# Patient Record
Sex: FEMALE | Race: WHITE | NOT HISPANIC OR LATINO | URBAN - METROPOLITAN AREA
[De-identification: names, ages, dates, MRNs, and addresses within clinical notes are randomized per-mention and may not be internally consistent; named-entity substitution may affect disease eponyms.]

---

## 2022-02-27 ENCOUNTER — EMERGENCY (EMERGENCY)
Facility: HOSPITAL | Age: 67
LOS: 1 days | Discharge: HOME | End: 2022-02-27
Attending: EMERGENCY MEDICINE
Payer: MEDICARE

## 2022-02-27 VITALS
RESPIRATION RATE: 20 BRPM | DIASTOLIC BLOOD PRESSURE: 103 MMHG | TEMPERATURE: 98 F | OXYGEN SATURATION: 98 % | HEART RATE: 100 BPM | SYSTOLIC BLOOD PRESSURE: 180 MMHG | WEIGHT: 119.93 LBS | HEIGHT: 65 IN

## 2022-02-27 VITALS
HEART RATE: 92 BPM | DIASTOLIC BLOOD PRESSURE: 90 MMHG | RESPIRATION RATE: 18 BRPM | SYSTOLIC BLOOD PRESSURE: 161 MMHG | OXYGEN SATURATION: 97 %

## 2022-02-27 PROCEDURE — 70450 CT HEAD/BRAIN W/O DYE: CPT | Mod: MA

## 2022-02-27 PROCEDURE — 70450 CT HEAD/BRAIN W/O DYE: CPT | Mod: 26,MA

## 2022-02-27 PROCEDURE — 99284 EMERGENCY DEPT VISIT MOD MDM: CPT | Mod: 25

## 2022-02-27 PROCEDURE — 12011 RPR F/E/E/N/L/M 2.5 CM/<: CPT

## 2022-02-27 PROCEDURE — 12011 RPR F/E/E/N/L/M 2.5 CM/<: CPT | Mod: GC

## 2022-02-27 PROCEDURE — 99284 EMERGENCY DEPT VISIT MOD MDM: CPT | Mod: 25,GC

## 2022-02-27 NOTE — ED ADULT NURSE NOTE - NSIMPLEMENTINTERV_GEN_ALL_ED
Implemented All Fall Risk Interventions:  [place] to call system. Call bell, personal items and telephone within reach. Instruct patient to call for assistance. Room bathroom lighting operational. Non-slip footwear when patient is off stretcher. Physically safe environment: no spills, clutter or unnecessary equipment. Stretcher in lowest position, wheels locked, appropriate side rails in place. Provide visual cue, wrist band, yellow gown, etc. Monitor gait and stability. Monitor for mental status changes and reorient to person, place, and time. Review medications for side effects contributing to fall risk. Reinforce activity limits and safety measures with patient and family.

## 2022-02-27 NOTE — ED ADULT NURSE NOTE - OBJECTIVE STATEMENT
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

## 2022-02-27 NOTE — ED PROVIDER NOTE - OBJECTIVE STATEMENT
67 yo F with no pmhx presents with laceration to her head s/p fall. States she tripped and fell about 45 min ago. Unsure of what she hit her head on. No LOC. Ambulatory. Denies pain anywhere. No preceding symptoms. Denies n/v, dizziness, change in vision (wears contacts, still wearing them), chest pain, shortness of breath, abd pain, change in strength or sensation in extremities. Tetanus 3-4yrs ago.

## 2022-02-27 NOTE — ED PROVIDER NOTE - NSFOLLOWUPINSTRUCTIONS_ED_ALL_ED_FT
You were seen for laceration that is repaired with vicryl absorbable sutures - 3 sutures in vertical part of the laceration, one running suture in horizontal part of the laceration. These sutures are absorbable but can take about 2months to be completely absorbed. Please have them removed on day 7.     This will likely cause a scar - avoid sun exposure to limit the scarring.    No signs of emergency medical condition on today's workup.  Your results are attached with your discharge instructions, please review them with your primary care physician. If there is a result pending, you will receive a call if test is positive.    A presumptive diagnosis is made today, but further evaluation may be required by your primary care doctor and/or specialist for a definitive diagnosis. Therefore, follow up as directed and if symptoms change/worsen or any emergency conditions, please return to the ER.    For pain or fever you can ibuprofen (motrin, advil) or tylenol as needed, as directed on packaging.    If needed, call patient access services at [phone] to find a primary care doctor, or call at [phone] to make an appointment at the clinic.    Care For Your Stitches    WHAT YOU NEED TO KNOW:    Stitches, or sutures, are used to close cuts and wounds on the skin. Stitches need to be removed after your wound has healed.             DISCHARGE INSTRUCTIONS:    Seek care immediately if:   •Your stitches come apart.      •Blood soaks through your bandages.      •You suddenly cannot move your injured joint.      •You have sudden numbness around your wound.      •You see red streaks coming from your wound.      Contact your healthcare provider if:   •You have a fever and chills.      •Your wound is red, warm, swollen, or leaking pus.      •There is a bad smell coming from your wound.      •You have increased pain in the wound area.      •You have questions or concerns about your condition or care.      Care for your stitches:   •Protect the stitches. You may need to cover your stitches with a bandage for 24 to 48 hours, or as directed. Do not bump or hit the suture area. This could open the wound. Do not trim or shorten the ends of your stitches. If they rub on your clothing, put a gauze bandage between the stitches and your clothes.      •Clean the area as directed. Carefully wash your wound with soap and water. For mouth and lip wounds, rinse your mouth after meals and at bedtime. Ask your healthcare provider what to use to rinse your mouth. If you have a scalp wound, you may gently wash your hair every 2 days with mild shampoo. Do not use hair products, such as hair spray. Check your wound for signs of infection when you clean it. Signs include redness, swelling, and pus.      •Keep the area dry as directed. Wait 12 to 24 hours after you receive your stitches before you take a shower. Take showers instead of baths. Do not take a bath or swim. Your healthcare provider will give you instructions for bathing with your stitches.    Help your wound heal:   •Elevate your wound. Keep your wound above the level of your heart as often as you can. This will help decrease swelling and pain. Prop the area on pillows or blankets, if possible, to keep it elevated comfortably.    •Limit activity. Do not stretch the skin around your wound. This will help prevent bleeding and swelling.    Follow up with your healthcare provider as directed: You may need to return to have your stitches removed. Write down your questions so you remember to ask them during your visits.

## 2022-02-27 NOTE — ED PROVIDER NOTE - PATIENT PORTAL LINK FT
You can access the FollowMyHealth Patient Portal offered by [facility] by registering at the following website: [email or web address]. By joining [facility]’s FollowMyHealth portal, you will also be able to view your health information using other applications (apps) compatible with our system.

## 2022-02-27 NOTE — ED PROVIDER NOTE - PHYSICAL EXAMINATION
Gen: non toxic appearing, NAD   Head: NC/NT - laceration above LT eyebrow - upside down T shaped - horizontal laceration about 5cm/vertical laceration about 3cm  Eyes: PERRL, EOMI without pain  ENT: airway patent, mmm   CV: RRR, +S1/S2   Resp: no respiratory distress  GI: abdomen soft non-distended, NTTP   Back: no spinal ttp  Extremities - FROM,  +U shaped intact skin flap of LT hand at ulnar aspect  Neuro: A&Ox4, 5/5 strength, sensation intact

## 2022-02-27 NOTE — ED PROVIDER NOTE - CLINICAL SUMMARY MEDICAL DECISION MAKING FREE TEXT BOX
Mechanical fall. Concern for laceration of head - needs to have suture repair.  Will head CT to eval for bleed. Tetanus is up to date.

## 2022-02-27 NOTE — ED PROVIDER NOTE - ATTENDING CONTRIBUTION TO CARE
67 yo female s/p mechanical slip/fall.  no LOC, no CP, no dizziness.  well appearing on exam though significant L forehead lac noted, see lac repair noted.  smaller hand lac does not require repair.  CT head unremarkable.

## 2025-04-17 PROBLEM — Z00.00 ENCOUNTER FOR PREVENTIVE HEALTH EXAMINATION: Status: ACTIVE | Noted: 2025-04-17
